# Patient Record
Sex: MALE | Race: WHITE | NOT HISPANIC OR LATINO | URBAN - METROPOLITAN AREA
[De-identification: names, ages, dates, MRNs, and addresses within clinical notes are randomized per-mention and may not be internally consistent; named-entity substitution may affect disease eponyms.]

---

## 2017-10-12 ENCOUNTER — EMERGENCY (EMERGENCY)
Facility: HOSPITAL | Age: 67
LOS: 1 days | Discharge: HOME | End: 2017-10-12
Attending: EMERGENCY MEDICINE | Admitting: EMERGENCY MEDICINE
Payer: MEDICARE

## 2017-10-12 VITALS
TEMPERATURE: 98 F | OXYGEN SATURATION: 100 % | DIASTOLIC BLOOD PRESSURE: 80 MMHG | SYSTOLIC BLOOD PRESSURE: 138 MMHG | HEART RATE: 65 BPM | RESPIRATION RATE: 18 BRPM

## 2017-10-12 VITALS
DIASTOLIC BLOOD PRESSURE: 77 MMHG | WEIGHT: 156.97 LBS | HEART RATE: 50 BPM | SYSTOLIC BLOOD PRESSURE: 135 MMHG | RESPIRATION RATE: 16 BRPM | HEIGHT: 66 IN

## 2017-10-12 DIAGNOSIS — R55 SYNCOPE AND COLLAPSE: ICD-10-CM

## 2017-10-12 DIAGNOSIS — I10 ESSENTIAL (PRIMARY) HYPERTENSION: ICD-10-CM

## 2017-10-12 LAB
ALBUMIN SERPL ELPH-MCNC: 3.4 G/DL — SIGNIFICANT CHANGE UP (ref 3.3–5)
ALP SERPL-CCNC: 65 U/L — SIGNIFICANT CHANGE UP (ref 40–120)
ALT FLD-CCNC: 24 U/L — SIGNIFICANT CHANGE UP (ref 12–78)
ANION GAP SERPL CALC-SCNC: 6 MMOL/L — SIGNIFICANT CHANGE UP (ref 5–17)
APTT BLD: 20.5 SEC — LOW (ref 27.5–37.4)
AST SERPL-CCNC: 20 U/L — SIGNIFICANT CHANGE UP (ref 15–37)
BASOPHILS # BLD AUTO: 0.1 K/UL — SIGNIFICANT CHANGE UP (ref 0–0.2)
BASOPHILS NFR BLD AUTO: 1.1 % — SIGNIFICANT CHANGE UP (ref 0–2)
BILIRUB SERPL-MCNC: 0.6 MG/DL — SIGNIFICANT CHANGE UP (ref 0.2–1.2)
BUN SERPL-MCNC: 16 MG/DL — SIGNIFICANT CHANGE UP (ref 7–23)
CALCIUM SERPL-MCNC: 8.4 MG/DL — LOW (ref 8.5–10.1)
CHLORIDE SERPL-SCNC: 110 MMOL/L — HIGH (ref 96–108)
CK MB BLD-MCNC: 0.9 % — SIGNIFICANT CHANGE UP (ref 0–3.5)
CK MB BLD-MCNC: 1.1 % — SIGNIFICANT CHANGE UP (ref 0–3.5)
CK MB CFR SERPL CALC: 1.1 NG/ML — SIGNIFICANT CHANGE UP (ref 0–3.6)
CK MB CFR SERPL CALC: 1.1 NG/ML — SIGNIFICANT CHANGE UP (ref 0–3.6)
CK SERPL-CCNC: 123 U/L — SIGNIFICANT CHANGE UP (ref 26–308)
CK SERPL-CCNC: 97 U/L — SIGNIFICANT CHANGE UP (ref 26–308)
CO2 SERPL-SCNC: 25 MMOL/L — SIGNIFICANT CHANGE UP (ref 22–31)
CREAT SERPL-MCNC: 1 MG/DL — SIGNIFICANT CHANGE UP (ref 0.5–1.3)
EOSINOPHIL # BLD AUTO: 0.1 K/UL — SIGNIFICANT CHANGE UP (ref 0–0.5)
EOSINOPHIL NFR BLD AUTO: 1.7 % — SIGNIFICANT CHANGE UP (ref 0–6)
GLUCOSE SERPL-MCNC: 105 MG/DL — HIGH (ref 70–99)
HCT VFR BLD CALC: 42.1 % — SIGNIFICANT CHANGE UP (ref 39–50)
HGB BLD-MCNC: 13.5 G/DL — SIGNIFICANT CHANGE UP (ref 13–17)
INR BLD: 1.01 RATIO — SIGNIFICANT CHANGE UP (ref 0.88–1.16)
LIDOCAIN IGE QN: 185 U/L — SIGNIFICANT CHANGE UP (ref 73–393)
LYMPHOCYTES # BLD AUTO: 1.5 K/UL — SIGNIFICANT CHANGE UP (ref 1–3.3)
LYMPHOCYTES # BLD AUTO: 24 % — SIGNIFICANT CHANGE UP (ref 13–44)
MCHC RBC-ENTMCNC: 30.7 PG — SIGNIFICANT CHANGE UP (ref 27–34)
MCHC RBC-ENTMCNC: 31.9 GM/DL — LOW (ref 32–36)
MCV RBC AUTO: 96.2 FL — SIGNIFICANT CHANGE UP (ref 80–100)
MONOCYTES # BLD AUTO: 0.4 K/UL — SIGNIFICANT CHANGE UP (ref 0–0.9)
MONOCYTES NFR BLD AUTO: 5.6 % — SIGNIFICANT CHANGE UP (ref 1–9)
NEUTROPHILS # BLD AUTO: 4.2 K/UL — SIGNIFICANT CHANGE UP (ref 1.8–7.4)
NEUTROPHILS NFR BLD AUTO: 67.5 % — SIGNIFICANT CHANGE UP (ref 43–77)
PLATELET # BLD AUTO: 158 K/UL — SIGNIFICANT CHANGE UP (ref 150–400)
POTASSIUM SERPL-MCNC: 3.9 MMOL/L — SIGNIFICANT CHANGE UP (ref 3.5–5.3)
POTASSIUM SERPL-SCNC: 3.9 MMOL/L — SIGNIFICANT CHANGE UP (ref 3.5–5.3)
PROT SERPL-MCNC: 6.9 G/DL — SIGNIFICANT CHANGE UP (ref 6–8.3)
PROTHROM AB SERPL-ACNC: 11 SEC — SIGNIFICANT CHANGE UP (ref 9.8–12.7)
RBC # BLD: 4.38 M/UL — SIGNIFICANT CHANGE UP (ref 4.2–5.8)
RBC # FLD: 12.2 % — SIGNIFICANT CHANGE UP (ref 10.3–14.5)
SODIUM SERPL-SCNC: 141 MMOL/L — SIGNIFICANT CHANGE UP (ref 135–145)
TROPONIN I SERPL-MCNC: <.015 NG/ML — SIGNIFICANT CHANGE UP (ref 0.01–0.04)
TROPONIN I SERPL-MCNC: <.015 NG/ML — SIGNIFICANT CHANGE UP (ref 0.01–0.04)
WBC # BLD: 6.2 K/UL — SIGNIFICANT CHANGE UP (ref 3.8–10.5)
WBC # FLD AUTO: 6.2 K/UL — SIGNIFICANT CHANGE UP (ref 3.8–10.5)

## 2017-10-12 PROCEDURE — 82550 ASSAY OF CK (CPK): CPT

## 2017-10-12 PROCEDURE — 99285 EMERGENCY DEPT VISIT HI MDM: CPT

## 2017-10-12 PROCEDURE — 82553 CREATINE MB FRACTION: CPT

## 2017-10-12 PROCEDURE — 36415 COLL VENOUS BLD VENIPUNCTURE: CPT

## 2017-10-12 PROCEDURE — 85027 COMPLETE CBC AUTOMATED: CPT

## 2017-10-12 PROCEDURE — 80053 COMPREHEN METABOLIC PANEL: CPT

## 2017-10-12 PROCEDURE — 84484 ASSAY OF TROPONIN QUANT: CPT

## 2017-10-12 PROCEDURE — 99284 EMERGENCY DEPT VISIT MOD MDM: CPT | Mod: 25

## 2017-10-12 PROCEDURE — 93005 ELECTROCARDIOGRAM TRACING: CPT

## 2017-10-12 PROCEDURE — 85610 PROTHROMBIN TIME: CPT

## 2017-10-12 PROCEDURE — 85730 THROMBOPLASTIN TIME PARTIAL: CPT

## 2017-10-12 PROCEDURE — 83690 ASSAY OF LIPASE: CPT

## 2017-10-12 RX ORDER — IRBESARTAN 75 MG/1
100 TABLET ORAL
Qty: 0 | Refills: 0 | COMMUNITY

## 2017-10-12 NOTE — ED ADULT NURSE REASSESSMENT NOTE - NS ED NURSE REASSESS COMMENT FT1
pt verbalized that he wants to sign out AMA, after physician assessed pt, pt agreed to stay and talk to cardiologist. Pt alert and oriented x 4. denies any pain.

## 2017-10-12 NOTE — CONSULT NOTE ADULT - ATTENDING COMMENTS
Seen/examine at bedside. Agree with above.  His episode sounds typically vasovagal in origin, with a typical prodrome. His EKG is Sinus bradycardia with no sign of ischemia. His story is not consistent with worsening of his conduction.  Labs unremarkable and troponin negative x 1.  Check orthostatics.  I have explained to him that he must stay hydrated and we have discussed maneuvers he can do to avoid his symptoms.  If second set of CE is negative, he can be discharged home with close cardiac follow up. He should have an echocardiogram and stress test in our office.

## 2017-10-12 NOTE — ED PROVIDER NOTE - PROGRESS NOTE DETAILS
discussed case with Dr. Finley, think this is still vasovagal, recommend 2nd CE if negative, may f/u with PMD
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
## 2017-10-12 NOTE — ED ADULT NURSE NOTE - OBJECTIVE STATEMENT
Brought in by EMS. Pt had syncopal episode at work while sitting at his desk. Pt has history of HTN. Pt states he had previous episode of syncope but today episode does not feel syncope. Denies any headache, sob or chest pain.

## 2017-10-12 NOTE — CONSULT NOTE ADULT - ASSESSMENT
65 yo M PMH HTN, knee surgery presents to the ED with syncope.    - Orthostatic vs Cardiogenic cause  - check orthostatics  - No clear evidence of acute ischemia, trops negative x 1, pt asymptomatic  - No evidence of volume overload  - No acute changes on EKG  - BP well controlled, continue home BP meds, monitor routine hemodynamics  - monitor and replete lytes, keep K>4, Mg>2  - Other cardiovascular workup will depend on clinical course.  - All other workup per primary team  - Will follow

## 2017-10-12 NOTE — ED PROVIDER NOTE - OBJECTIVE STATEMENT
65 yo male hx of htn, vasovagal syncope BIBEMS for episode of syncope while at working while sitting at his desk.  States that this does not feel like his previous episodes of syncope. 65 yo male hx of htn, vasovagal syncope BIBEMS for episode of syncope while at working while sitting at his desk.  States that this does not feel like his previous episodes of syncope. +LOC, denies any head injury. PMD Dr. Boyd

## 2017-10-12 NOTE — ED ADULT TRIAGE NOTE - CHIEF COMPLAINT QUOTE
pt s/p syncopal episode while at work while sitting at table, no awake and alert, denies cp/sob, low b/p on ems arrival, bradycardia

## 2019-08-12 ENCOUNTER — OUTPATIENT (OUTPATIENT)
Dept: OUTPATIENT SERVICES | Facility: HOSPITAL | Age: 69
LOS: 1 days | End: 2019-08-12
Payer: MEDICARE

## 2019-08-12 VITALS
DIASTOLIC BLOOD PRESSURE: 87 MMHG | HEIGHT: 65 IN | SYSTOLIC BLOOD PRESSURE: 145 MMHG | TEMPERATURE: 98 F | OXYGEN SATURATION: 97 % | HEART RATE: 62 BPM | WEIGHT: 162.26 LBS | RESPIRATION RATE: 12 BRPM

## 2019-08-12 VITALS
OXYGEN SATURATION: 97 % | RESPIRATION RATE: 12 BRPM | DIASTOLIC BLOOD PRESSURE: 86 MMHG | HEART RATE: 63 BPM | SYSTOLIC BLOOD PRESSURE: 142 MMHG

## 2019-08-12 DIAGNOSIS — Z98.890 OTHER SPECIFIED POSTPROCEDURAL STATES: Chronic | ICD-10-CM

## 2019-08-12 DIAGNOSIS — H33.21 SEROUS RETINAL DETACHMENT, RIGHT EYE: ICD-10-CM

## 2019-08-12 PROBLEM — I10 ESSENTIAL (PRIMARY) HYPERTENSION: Chronic | Status: ACTIVE | Noted: 2017-10-12

## 2019-08-12 PROCEDURE — 67108 REPAIR DETACHED RETINA: CPT | Mod: RT

## 2019-08-12 PROCEDURE — C1889: CPT

## 2019-08-12 PROCEDURE — 93005 ELECTROCARDIOGRAM TRACING: CPT

## 2019-08-12 PROCEDURE — 93010 ELECTROCARDIOGRAM REPORT: CPT

## 2019-08-12 PROCEDURE — C1784: CPT

## 2019-08-12 NOTE — ASU DISCHARGE PLAN (ADULT/PEDIATRIC) - CARE PROVIDER_API CALL
Maxx Cerrato (MD)  Ophthalmology  [address] [place] [address], [address]  [place] [address]  Phone: [phone]  Fax: [phone]  Follow Up Time:

## 2019-08-12 NOTE — H&P PST ADULT - ASSESSMENT
68M with HTN and prior syncopal episodes here for Vitrectomy.  Though is could be likely that most of his syncopal episodes are vaso-vagal in nature, it is not clear to what extent workup has been done for him to elicit etiology.  Not clear if there is an underlying arrhythmia.  He will be undergoing anesthesia with retro-bulbar block which could precipitate a cardiac event and for this reason I have consulted with Cardiology who will see him prior to proceeding to surgery.  This was discussed with his anesthesiologist as well.

## 2019-08-12 NOTE — ASU DISCHARGE PLAN (ADULT/PEDIATRIC) - CALL YOUR DOCTOR IF YOU HAVE ANY OF THE FOLLOWING:
Bleeding that does not stop/Fever greater than (need to indicate Fahrenheit or Celsius)/Pain not relieved by Medications/Swelling that gets worse/Nausea and vomiting that does not stop

## 2019-08-12 NOTE — CONSULT NOTE ADULT - SUBJECTIVE AND OBJECTIVE BOX
History of Present Illness: The patient is a 68 year old male with a history of HTN, vasovagal syncope who presents for urgent retina surgery. He feels well and denies chest pain, shortness of breath, dizziness, palpitations. No exertional symptoms. No recent cardiac testing. Has a history of vasovagal syncope, usually post-prandial. Last episode was about 2 years ago.    Past Medical/Surgical History:  HTN, vasovagal syncope    Medications:  Irbesartan 300 mg daily    Family History: Non-contributory family history of premature cardiovascular atherosclerotic disease    Social History: No tobacco, alcohol or drug use    Review of Systems:  General: No fevers, chills, weight loss or gain  Skin: No rashes, color changes  Cardiovascular: No chest pain, orthopnea  Respiratory: No shortness of breath, cough  Gastrointestinal: No nausea, abdominal pain  Genitourinary: No incontinence, pain with urination  Musculoskeletal: No pain, swelling, decreased range of motion  Neurological: No headache, weakness  Psychiatric: No depression, anxiety  Endocrine: No weight loss or gain, increased thirst  All other systems are comprehensively negative.    Physical Exam:  Vitals:        Vital Signs Last 24 Hrs  T(C): 36.8 (12 Aug 2019 09:31), Max: 36.8 (12 Aug 2019 09:31)  T(F): 98.3 (12 Aug 2019 09:31), Max: 98.3 (12 Aug 2019 09:31)  HR: 62 (12 Aug 2019 09:31) (62 - 62)  BP: 145/87 (12 Aug 2019 09:31) (145/87 - 145/87)  BP(mean): --  RR: 12 (12 Aug 2019 09:31) (12 - 12)  SpO2: 97% (12 Aug 2019 09:31) (97% - 97%)  General: NAD  HEENT: MMM  Neck: No JVD, no carotid bruit  Lungs: CTAB  CV: RRR, nl S1/S2, no M/R/G  Abdomen: S/NT/ND, +BS  Extremities: No LE edema, no cyanosis  Neuro: AAOx3, non-focal  Skin: No rash    Labs:                  ECG: Sinus bradycardia, normal axis, no ST abnormality

## 2019-08-12 NOTE — CONSULT NOTE ADULT - ASSESSMENT
The patient is a 68 year old male with a history of HTN, vasovagal syncope who presents for urgent retina surgery.    Plan:  - ECG with no evidence of ischemia or infarction  - The patient is asymptomatic from a cardiac perspective and there are no active cardiac issues. He is at low risk for cardiac events for a low risk surgery. He is optimized to proceed without additional cardiac testing.

## 2019-08-12 NOTE — H&P PST ADULT - HISTORY OF PRESENT ILLNESS
68M with HTN noticed decreased vision in his right eye, along with some streaks of light and increase in floaters several days ago.  Vision progressively worsened.  Patient evaluated with Optho and Retina specialist and diagnosed with retinal detachment and was scheduled for elective surgery today here in [facility].  I have been called to evaluate patient for medical clearance.  On history taking the patient endorses that he has had several syncopal episodes in his life with the last one being 2 years ago while in a meeting for his job.  His symptoms typically start off with feeling faint and light headed and usually after eating meal he states leading to syncope.  He is usually down for several minutes and remembers the events prior to and after the event.  No seizure like activity was reported to him by witnesses or any bowel bladder incontinence.  Patient apparently was seen by his PCP and a cardiologist but not clear what the workup was for this.  He does not recall having a event monitor, echo or stress test being done.  He was told that it was most likely vaso-vagal in nature by his PCP.   Patient has denied any chest pain, SOB and is able to tolerate 4METS worth of strenous activity.

## 2020-11-12 PROBLEM — Z00.00 ENCOUNTER FOR PREVENTIVE HEALTH EXAMINATION: Status: ACTIVE | Noted: 2020-11-12

## 2020-11-27 ENCOUNTER — TRANSCRIPTION ENCOUNTER (OUTPATIENT)
Age: 70
End: 2020-11-27

## 2023-09-06 NOTE — ED PROVIDER NOTE - CARE PLAN
Paty Uribe RN KB    8/15/23 11:58 AM  Note      SURGERY SCHEDULING REQUIREMENTS INCLUDE:  Facility: [facility]  Admission Type: Day Surgery   Time Needed: 15 minutes  Anesthesia: Local  If MAC: Preop with PCP required for SCS Perm implants only? No  MRSA SWAB required? No  NPO: No   Procedure: Left  L4-L5, L5-S1 TFESI  Dx/CPT CODE: 07970, 11993, M54..17, M48.061  Anticoagulation:   Is the patient on Coumadin/Warfarin, Lovenox, Plavix, or Aspirin/Excedrin? Yes,  Hold MELOXICAM for 4 days prior to procedure and 24 hours following.    Hold IBUPROFEN for 24 hours prior to procedure and 24 hours following.     OR NSAIDS (OTC products)? - Hold for 24 hours  Does patient take any GLP-1 inhibitors/agonists (Ozempic, Trulicity, Victoza, etc)? No  If yes, need to hold for 7 days prior to any procedures with sedation. Patients need to stop all solids 24 hours prior to surgery, may have clear liquids until 8 hours prior to surgery, then NPO.  INR Check: No  Platelets WNL?     PLT (K/mcL)  Date Value  04/12/2023 198         Sleep Apnea: No  Latex Allergy: No  Diabetic: Yes Insulin pump or glucose monitor? No  Pacemaker: No  Heart/Lung issues: no (if yes need to verify approval for anesthesia with MD)  Stroke/MI in the past 6 months: No  Estimated body mass index is 22.35 kg/m² as calculated from the following:    Height as of 7/11/23: 6' (1.829 m).    Weight as of 8/9/23: 74.8 kg (164 lb 12.8 oz).  Special Instructions: Under Fluoroscopy      Follow up:Mirela        Principal Discharge DX:	Syncope, unspecified syncope type